# Patient Record
Sex: MALE | Race: WHITE
[De-identification: names, ages, dates, MRNs, and addresses within clinical notes are randomized per-mention and may not be internally consistent; named-entity substitution may affect disease eponyms.]

---

## 2019-11-05 ENCOUNTER — HOSPITAL ENCOUNTER (EMERGENCY)
Dept: HOSPITAL 33 - ED | Age: 24
Discharge: HOME | End: 2019-11-05
Payer: COMMERCIAL

## 2019-11-05 VITALS — HEART RATE: 75 BPM | SYSTOLIC BLOOD PRESSURE: 122 MMHG | DIASTOLIC BLOOD PRESSURE: 77 MMHG | OXYGEN SATURATION: 97 %
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
## 2019-11-05 DIAGNOSIS — Z04.1: Primary | ICD-10-CM

## 2019-11-05 PROCEDURE — 73080 X-RAY EXAM OF ELBOW: CPT

## 2019-11-05 PROCEDURE — 71045 X-RAY EXAM CHEST 1 VIEW: CPT

## 2019-11-05 PROCEDURE — 72128 CT CHEST SPINE W/O DYE: CPT

## 2019-11-05 PROCEDURE — 93005 ELECTROCARDIOGRAM TRACING: CPT

## 2019-11-05 PROCEDURE — 70450 CT HEAD/BRAIN W/O DYE: CPT

## 2019-11-05 PROCEDURE — 72125 CT NECK SPINE W/O DYE: CPT

## 2019-11-05 PROCEDURE — 99285 EMERGENCY DEPT VISIT HI MDM: CPT
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
## 2019-11-25 ENCOUNTER — HOSPITAL ENCOUNTER (OUTPATIENT)
Dept: HOSPITAL 33 - ED | Age: 24
Setting detail: OBSERVATION
Discharge: HOME | End: 2019-11-25
Attending: FAMILY MEDICINE | Admitting: FAMILY MEDICINE
Payer: COMMERCIAL

## 2019-11-25 VITALS — OXYGEN SATURATION: 96 %

## 2019-11-25 VITALS — SYSTOLIC BLOOD PRESSURE: 92 MMHG | DIASTOLIC BLOOD PRESSURE: 55 MMHG | HEART RATE: 65 BPM

## 2019-11-25 DIAGNOSIS — G40.909: ICD-10-CM

## 2019-11-25 DIAGNOSIS — F10.129: Primary | ICD-10-CM

## 2019-11-25 LAB
ALBUMIN SERPL-MCNC: 4.5 G/DL (ref 3.5–5)
ALP SERPL-CCNC: 64 U/L (ref 38–126)
ALT SERPL-CCNC: 24 U/L (ref 0–50)
AMPHETAMINES UR QL: NEGATIVE
ANION GAP SERPL CALC-SCNC: 16.6 MEQ/L (ref 5–15)
APAP SPEC-MCNC: < 10 UG/ML (ref 10–30)
AST SERPL QL: 28 U/L (ref 17–59)
BARBITURATES UR QL: NEGATIVE
BASOPHILS # BLD AUTO: 0.02 10*3/UL (ref 0–0.4)
BASOPHILS NFR BLD AUTO: 0.1 % (ref 0–0.4)
BENZODIAZ UR QL SCN: NEGATIVE
BILIRUB BLD-MCNC: 0.9 MG/DL (ref 0.2–1.3)
BUN SERPL-MCNC: 13 MG/DL (ref 9–20)
CALCIUM SPEC-MCNC: 8.9 MG/DL (ref 8.4–10.2)
CHLORIDE SERPL-SCNC: 108 MMOL/L (ref 98–107)
CO2 SERPL-SCNC: 25 MMOL/L (ref 22–30)
COCAINE UR QL SCN: NEGATIVE
CREAT SERPL-MCNC: 0.96 MG/DL (ref 0.66–1.25)
EOSINOPHIL # BLD AUTO: 0.04 10*3/UL (ref 0–0.5)
ETHANOL SERPL-MCNC: 161 MG/DL (ref 0–10)
GLUCOSE SERPL-MCNC: 112 MG/DL (ref 74–106)
GLUCOSE UR-MCNC: NEGATIVE MG/DL
HCT VFR BLD AUTO: 45.2 % (ref 42–50)
HGB BLD-MCNC: 15.2 GM/DL (ref 12.5–18)
LYMPHOCYTES # SPEC AUTO: 1 10*3/UL (ref 1–4.6)
MCH RBC QN AUTO: 30 PG (ref 26–32)
MCHC RBC AUTO-ENTMCNC: 33.6 G/DL (ref 32–36)
METHADONE UR QL: NEGATIVE
MONOCYTES # BLD AUTO: 0.86 10*3/UL (ref 0–1.3)
OPIATES UR QL: NEGATIVE
PCP UR QL CFM>20 NG/ML: NEGATIVE
PLATELET # BLD AUTO: 297 K/MM3 (ref 150–450)
POTASSIUM SERPLBLD-SCNC: 3.7 MMOL/L (ref 3.5–5.1)
PROT SERPL-MCNC: 7.6 G/DL (ref 6.3–8.2)
PROT UR STRIP-MCNC: NEGATIVE MG/DL
RBC # BLD AUTO: 5.07 M/MM3 (ref 4.1–5.6)
RBC #/AREA URNS HPF: (no result) /HPF (ref 0–2)
SODIUM SERPL-SCNC: 146 MMOL/L (ref 137–145)
THC UR QL SCN: NEGATIVE
WBC # BLD AUTO: 13.8 K/MM3 (ref 4–10.5)
WBC #/AREA URNS HPF: (no result) /HPF (ref 0–5)

## 2019-11-25 PROCEDURE — 51702 INSERT TEMP BLADDER CATH: CPT

## 2019-11-25 PROCEDURE — 96360 HYDRATION IV INFUSION INIT: CPT

## 2019-11-25 PROCEDURE — G0480 DRUG TEST DEF 1-7 CLASSES: HCPCS

## 2019-11-25 PROCEDURE — 99285 EMERGENCY DEPT VISIT HI MDM: CPT

## 2019-11-25 PROCEDURE — 80053 COMPREHEN METABOLIC PANEL: CPT

## 2019-11-25 PROCEDURE — 70450 CT HEAD/BRAIN W/O DYE: CPT

## 2019-11-25 PROCEDURE — 93041 RHYTHM ECG TRACING: CPT

## 2019-11-25 PROCEDURE — 36000 PLACE NEEDLE IN VEIN: CPT

## 2019-11-25 PROCEDURE — 85025 COMPLETE CBC W/AUTO DIFF WBC: CPT

## 2019-11-25 PROCEDURE — 96374 THER/PROPH/DIAG INJ IV PUSH: CPT

## 2019-11-25 PROCEDURE — 81001 URINALYSIS AUTO W/SCOPE: CPT

## 2019-11-25 PROCEDURE — 36415 COLL VENOUS BLD VENIPUNCTURE: CPT

## 2019-11-25 PROCEDURE — 80307 DRUG TEST PRSMV CHEM ANLYZR: CPT

## 2019-11-25 PROCEDURE — 94762 N-INVAS EAR/PLS OXIMTRY CONT: CPT

## 2019-11-25 PROCEDURE — G0378 HOSPITAL OBSERVATION PER HR: HCPCS

## 2019-11-25 NOTE — XRAY
Indication: Head injury following seizure-like activity.  Alcohol intoxication.



Multiple contiguous axial images obtained through the head without contrast.



Comparison: November 5, 2019.



Again normal appearing brain parenchyma, ventricles, and bony calvarium.

Visualized paranasal sinuses and mastoid air cells are clear.



Impression: Stable normal CT head without contrast exam.



CT DI 61.68

## 2019-11-25 NOTE — ERPHSYRPT
- History of Present Illness


Source: patient, EMS, other (fiance)


Exam Limitations: clinical condition, intoxication


Timing/Duration: worse


Severity: moderate


Associated Symptoms: vomiting, shortness of breath, No nausea, No abdominal pain


Hx Tetanus, Diphtheria Vaccination/Date Given: No


Hx Influenza Vaccination/Date Given: No


Hx Pneumococcal Vaccination/Date Given: No





<YUE COX - Last Filed: 11/25/19 06:49>





<LUKASZ NICHOLAS - Last Filed: 11/25/19 08:18>





- History of Present Illness


Time Seen by Provider: 11/25/19 05:05


Physician History: 





25 y/o white male presents via ems with acute alcohol intoxication symptoms. pt 

breathing on his own and but not answering questions. pt began consuming etoh (1

/2 a fifth of [employer] and 2 beers) yesterday. last etoh between 2 and 3 am. pt 

has at least 3 seizure episodes. two at home and one in ems en route to this 

ED. pt vomited twice. pt hit his head when seizing. pt did not use any other 

illicit drugs per fiance. pt received 2mg iv versed at 0440 (YUE COX

)


Allergies/Adverse Reactions: 








No Known Drug Allergies Allergy (Verified 11/25/19 05:25)


 





Home Medications: 








No Reportable Medications [No Reported Medications]  11/05/19 [History]








- Review of Systems


Constitutional: No Symptoms


Eyes: No Symptoms


Ears, Nose, & Throat: No Symptoms


Respiratory: No Symptoms


Cardiac: No Symptoms


Abdominal/Gastrointestinal: No Symptoms


Genitourinary Symptoms: No Symptoms


Musculoskeletal: No Symptoms


Skin: No Symptoms


Neurological: No Symptoms, Other (alcohol intoxication)


Psychological: Alcohol Abuse


Endocrine: No Symptoms


Hematologic/Lymphatic: No Symptoms


Immunological/Allergic: No Symptoms


All Other Systems: Reviewed and Negative





<YUE COX - Last Filed: 11/25/19 06:49>





- Past Medical History


Pertinent Past Medical History: No


Neurological History: No Pertinent History


ENT History: No Pertinent History


Cardiac History: No Pertinent History


Respiratory History: No Pertinent History


Endocrine Medical History: No Pertinent History


Musculoskeletal History: No Pertinent History


GI Medical History: No Pertinent History


 History: No Pertinent History


Psycho-Social History: No Pertinent History


Male Reproductive Disorders: No Pertinent History





- Past Surgical History


Past Surgical History: Yes


Neuro Surgical History: No Pertinent History


Cardiac: No Pertinent History


Respiratory: No Pertinent History


Gastrointestinal: No Pertinent History


Genitourinary: No Pertinent History


Musculoskeletal: No Pertinent History


Male Surgical History: No Pertinent History


Other Surgical History: genital ventral cordia??





- Social History


Smoking Status: Current every day smoker


How long have you smoked: 13 years


Exposure to second hand smoke: Yes


Drug Use: none


Patient Lives Alone: No





<YUE COX - Last Filed: 11/25/19 06:49>





- Physical Exam


General Appearance: lethargy (secondary to etoh)


Eye Exam: PERRL/EOMI, eyes nml inspection


Ears, Nose, Throat Exam: normal ENT inspection, moist mucous membranes


Neck Exam: normal inspection, non-tender, supple, full range of motion


Respiratory Exam: normal breath sounds, lungs clear, airway intact, No chest 

tenderness, No respiratory distress


Cardiovascular Exam: regular rate/rhythm, normal heart sounds, normal 

peripheral pulses


Gastrointestinal/Abdomen Exam: soft, normal bowel sounds, No tenderness


Rectal Exam: not done


Back Exam: normal inspection, normal range of motion, No CVA tenderness, No 

vertebral tenderness


Extremity Exam: normal inspection, normal range of motion, pelvis stable


Neurologic Exam: alert, oriented x 3, cooperative, CNs II-XII nml as tested


Skin Exam: normal color, warm, dry


Lymphatic Exam: No adenopathy


**SpO2 Interpretation**: normal


SpO2: 99


O2 Delivery: Room Air





<YUE COX - Last Filed: 11/25/19 06:49>





- Nursing Vital Signs


Nursing Vital Signs: 





 Initial Vital Signs











Temperature  97.1 F   11/25/19 05:01


 


Pulse Rate  78   11/25/19 05:01


 


Respiratory Rate  18   11/25/19 05:01


 


Blood Pressure  116/62   11/25/19 05:01


 


O2 Sat by Pulse Oximetry  99   11/25/19 05:01








 Pain Scale











Pain Intensity                 0

















- Course


Nursing assessment & vital signs reviewed: Yes





<YUE COX - Last Filed: 11/25/19 06:49>


Ordered Tests: 





 Active Orders 24 hr











 Category Date Time Status


 


 Cardiac Monitor STAT Care  11/25/19 05:25 Active


 


 IV Insertion STAT Care  11/25/19 05:24 Active


 


 HEAD WITHOUT CONTRAST [CT] Stat Exams  11/25/19 05:26 Taken


 


 ACETAMINOPHEN Stat Lab  11/25/19 05:49 Completed


 


 CBC W DIFF Stat Lab  11/25/19 05:49 Completed


 


 CMP Stat Lab  11/25/19 05:49 Completed


 


 ETHYL ALCOHOL Stat Lab  11/25/19 05:49 Completed


 


 UA W/RFX UR CULTURE Stat Lab  11/25/19 05:49 Completed


 


 Urine Triage Profile Stat Lab  11/25/19 05:49 Completed








Medication Summary














Discontinued Medications














Generic Name Dose Route Start Last Admin





  Trade Name Joseph  PRN Reason Stop Dose Admin


 


Sodium Chloride  Confirm  11/25/19 05:20  





  Sodium Chloride 0.9% 1000 Ml  Administered  11/25/19 05:21  





  Dose   





  1,000 mls @ ud   





  .ROUTE   





  .STK-MED ONE   





     





     





     





     


 


Sodium Chloride  1,000 mls @ 999 mls/hr  11/25/19 05:24  11/25/19 07:16





  Sodium Chloride 0.9% 1000 Ml  IV  11/25/19 06:24  Infused





  .Q1H1M STA   Infusion





     





     





     





     


 


Lorazepam  Confirm  11/25/19 05:37  





  Ativan 2 Mg/1 Ml Vial***  Administered  11/25/19 05:38  





  Dose   





  2 mg   





  .ROUTE   





  .STK-MED ONE   





     





     





     





     


 


Lorazepam  1 mg  11/25/19 05:39  11/25/19 05:48





  Ativan 2 Mg/1 Ml Vial***  IV  11/25/19 05:40  1 mg





  STAT ONE   Administration





     





     





     





     


 


Ondansetron HCl  4 mg  11/25/19 05:24  11/25/19 07:15





  Zofran 4 Mg/2 Ml Vial**  IV  11/25/19 05:25  Not Given





  STAT ONE   





     





     





     





     











Lab/Rad Data: 





 Laboratory Result Diagrams





 11/25/19 05:49 





 11/25/19 05:49 





 Laboratory Results











  11/25/19 11/25/19 11/25/19 Range/Units





  05:49 05:49 05:49 


 


WBC     (4.0-10.5)  K/mm3


 


RBC     (4.1-5.6)  M/mm3


 


Hgb     (12.5-18.0)  gm/dl


 


Hct     (42-50)  %


 


MCV     ()  fl


 


MCH     (26-32)  pg


 


MCHC     (32-36)  g/dl


 


RDW     (11.5-14.0)  %


 


Plt Count     (150-450)  K/mm3


 


MPV     (6-9.5)  fl


 


Gran %     (36.0-66.0)  %


 


Eos # (Auto)     (0-0.5)  


 


Absolute Lymphs (auto)     (1.0-4.6)  


 


Absolute Monos (auto)     (0.0-1.3)  


 


Lymphocytes %     (24.0-44.0)  %


 


Monocytes %     (0.0-12.0)  %


 


Eosinophils %     (0.00-5.0)  %


 


Basophils %     (0.0-0.4)  %


 


Absolute Granulocytes     (1.4-6.9)  


 


Basophils #     (0-0.4)  


 


Sodium    146 H  (137-145)  mmol/L


 


Potassium    3.7  (3.5-5.1)  mmol/L


 


Chloride    108 H  ()  mmol/L


 


Carbon Dioxide    25  (22-30)  mmol/L


 


Anion Gap    16.6 H  (5-15)  MEQ/L


 


BUN    13  (9-20)  mg/dL


 


Creatinine    0.96  (0.66-1.25)  mg/dL


 


Estimated GFR    > 60.0  ML/MIN


 


Glucose    112 H  ()  mg/dL


 


Calcium    8.9  (8.4-10.2)  mg/dL


 


Total Bilirubin    0.90  (0.2-1.3)  mg/dL


 


AST    28  (17-59)  U/L


 


ALT    24  (0-50)  U/L


 


Alkaline Phosphatase    64  ()  U/L


 


Serum Total Protein    7.6  (6.3-8.2)  g/dL


 


Albumin    4.5  (3.5-5.0)  g/dL


 


Urine Color   COLORLESS   (YELLOW)  


 


Urine Appearance   CLEAR   (CLEAR)  


 


Urine pH   6.0   (5-6)  


 


Ur Specific Gravity   1.003   (1.005-1.025)  


 


Urine Protein   NEGATIVE   (Negative)  


 


Urine Ketones   NEGATIVE   (NEGATIVE)  


 


Urine Blood   NEGATIVE   (0-5)  Kory/ul


 


Urine Nitrite   NEGATIVE   (NEGATIVE)  


 


Urine Bilirubin   NEGATIVE   (NEGATIVE)  


 


Urine Urobilinogen   NEGATIVE   (0-1)  mg/dL


 


Ur Leukocyte Esterase   NEGATIVE   (NEGATIVE)  


 


Urine WBC (Auto)   NONE   (0-5)  /HPF


 


Urine RBC (Auto)   NONE   (0-2)  /HPF


 


U Epithel Cells (Auto)   NONE   (FEW)  /HPF


 


Urine Bacteria (Auto)   NONE   (NEGATIVE)  /HPF


 


Urine Mucus (Auto)   SLIGHT   (NEGATIVE)  /HPF


 


Urine Culture Reflexed   NO   (NO)  


 


Urine Glucose   NEGATIVE   (NEGATIVE)  mg/dL


 


Urine Opiates Level  NEGATIVE    (NEGATIVE)  


 


Ur Methadone  NEGATIVE    (NEGATIVE)  


 


Acetaminophen    < 10 L  (10-30)  ug/ml


 


Urine Barbiturates  NEGATIVE    (NEGATIVE)  


 


Ur Phencyclidine (PCP)  NEGATIVE    (NEGATIVE)  


 


Urine Amphetamine  NEGATIVE    (NEGATIVE)  


 


U Benzodiazepine Level  NEGATIVE    (NEGATIVE)  


 


Urine Cocaine  NEGATIVE    (NEGATIVE)  


 


Urine Marijuana (THC)  NEGATIVE    (NEGATIVE)  


 


Ethyl Alcohol    161 H  (0-10)  mg/dL














  11/25/19 Range/Units





  05:49 


 


WBC  13.8 H  (4.0-10.5)  K/mm3


 


RBC  5.07  (4.1-5.6)  M/mm3


 


Hgb  15.2  (12.5-18.0)  gm/dl


 


Hct  45.2  (42-50)  %


 


MCV  89.2  ()  fl


 


MCH  30.0  (26-32)  pg


 


MCHC  33.6  (32-36)  g/dl


 


RDW  13.2  (11.5-14.0)  %


 


Plt Count  297  (150-450)  K/mm3


 


MPV  8.4  (6-9.5)  fl


 


Gran %  86.0 H  (36.0-66.0)  %


 


Eos # (Auto)  0.04  (0-0.5)  


 


Absolute Lymphs (auto)  1.00  (1.0-4.6)  


 


Absolute Monos (auto)  0.86  (0.0-1.3)  


 


Lymphocytes %  7.3 L  (24.0-44.0)  %


 


Monocytes %  6.3  (0.0-12.0)  %


 


Eosinophils %  0.3  (0.00-5.0)  %


 


Basophils %  0.1  (0.0-0.4)  %


 


Absolute Granulocytes  11.84 H  (1.4-6.9)  


 


Basophils #  0.02  (0-0.4)  


 


Sodium   (137-145)  mmol/L


 


Potassium   (3.5-5.1)  mmol/L


 


Chloride   ()  mmol/L


 


Carbon Dioxide   (22-30)  mmol/L


 


Anion Gap   (5-15)  MEQ/L


 


BUN   (9-20)  mg/dL


 


Creatinine   (0.66-1.25)  mg/dL


 


Estimated GFR   ML/MIN


 


Glucose   ()  mg/dL


 


Calcium   (8.4-10.2)  mg/dL


 


Total Bilirubin   (0.2-1.3)  mg/dL


 


AST   (17-59)  U/L


 


ALT   (0-50)  U/L


 


Alkaline Phosphatase   ()  U/L


 


Serum Total Protein   (6.3-8.2)  g/dL


 


Albumin   (3.5-5.0)  g/dL


 


Urine Color   (YELLOW)  


 


Urine Appearance   (CLEAR)  


 


Urine pH   (5-6)  


 


Ur Specific Gravity   (1.005-1.025)  


 


Urine Protein   (Negative)  


 


Urine Ketones   (NEGATIVE)  


 


Urine Blood   (0-5)  Kory/ul


 


Urine Nitrite   (NEGATIVE)  


 


Urine Bilirubin   (NEGATIVE)  


 


Urine Urobilinogen   (0-1)  mg/dL


 


Ur Leukocyte Esterase   (NEGATIVE)  


 


Urine WBC (Auto)   (0-5)  /HPF


 


Urine RBC (Auto)   (0-2)  /HPF


 


U Epithel Cells (Auto)   (FEW)  /HPF


 


Urine Bacteria (Auto)   (NEGATIVE)  /HPF


 


Urine Mucus (Auto)   (NEGATIVE)  /HPF


 


Urine Culture Reflexed   (NO)  


 


Urine Glucose   (NEGATIVE)  mg/dL


 


Urine Opiates Level   (NEGATIVE)  


 


Ur Methadone   (NEGATIVE)  


 


Acetaminophen   (10-30)  ug/ml


 


Urine Barbiturates   (NEGATIVE)  


 


Ur Phencyclidine (PCP)   (NEGATIVE)  


 


Urine Amphetamine   (NEGATIVE)  


 


U Benzodiazepine Level   (NEGATIVE)  


 


Urine Cocaine   (NEGATIVE)  


 


Urine Marijuana (THC)   (NEGATIVE)  


 


Ethyl Alcohol   (0-10)  mg/dL














- Progress


Progress: improved


Counseled pt/family regarding: lab results, diagnosis





<YUE COX - Last Filed: 11/25/19 06:49>





- Progress


Progress: improved


Discussed with : Emily


Will see patient in: hospital (observation)


Counseled pt/family regarding: lab results, diagnosis





<LUKASZ NICHOLAS - Last Filed: 11/25/19 08:18>





- Progress


Progress Note: 





11/25/19 05:22


pt now much more awake and alert. wants gibbs catheter out.


11/25/19 06:49


transfer care to dr. nicholas. i reviewed pt care and pending studies. he 

accepts and assumes care.  (YUE COX)





<YUE COX - Last Filed: 11/25/19 06:49>





- Departure


Departure Disposition: Observation


Critical Care Time: No





<LUKASZ NICHOLAS - Last Filed: 11/25/19 08:18>





- Departure


Clinical Impression: 


 Alcohol related seizure





Condition: Fair


Referrals: 


FEDERICA ANN [Primary Care Provider] -
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
